# Patient Record
Sex: MALE | Race: WHITE | Employment: FULL TIME | URBAN - NONMETROPOLITAN AREA
[De-identification: names, ages, dates, MRNs, and addresses within clinical notes are randomized per-mention and may not be internally consistent; named-entity substitution may affect disease eponyms.]

---

## 2024-03-06 ENCOUNTER — OFFICE VISIT (OUTPATIENT)
Dept: ORTHOPEDIC SURGERY | Age: 44
End: 2024-03-06
Payer: COMMERCIAL

## 2024-03-06 VITALS — BODY MASS INDEX: 30.06 KG/M2 | HEIGHT: 70 IN | WEIGHT: 210 LBS

## 2024-03-06 DIAGNOSIS — M25.562 ACUTE PAIN OF LEFT KNEE: Primary | ICD-10-CM

## 2024-03-06 DIAGNOSIS — M17.10 KNEE ARTHROPATHY: ICD-10-CM

## 2024-03-06 PROCEDURE — 99203 OFFICE O/P NEW LOW 30 MIN: CPT | Performed by: PHYSICIAN ASSISTANT

## 2024-03-06 NOTE — PROGRESS NOTES
[facility]  New Patient Note      CHIEF COMPLAINT:   Chief Complaint   Patient presents with    Knee Pain     Pt presents this AM with c/o pain in his L knee. States that he has noticed pain since yesterday. Noticed pop while turing and pivoting. Pain is in medial and lateral knee, and is most notable with extension and flexion. ROM it limited by pain. Has taken Ibuprofen to manage his symptoms.        HISTORY OF PRESENT ILLNESS:                The patient is a 43 y.o. male who presents today with complaints of left knee pain that began yesterday when he pivoted on his knee and heard a pop.  He reports he had immediate pain and swelling following that incident.  Since that time he has had difficulty bearing weight.  Pt localizes the pain to medial and lateral knee.  Pt denies any numbness, tingling, loss of sensation or radiation of symptoms into toes.  Pain is worse with range of motion, ambulation, weightbearing.  They have tried at home therapies of ibuprofen for symptomatic relief as well as bracing.  Pt has history of ACL tear with surgical fixation approxi-10 years ago Dr. Lewis.    Past Medical History:    No past medical history on file.  Past Surgical History:    No past surgical history on file.  Current Medications:   No current facility-administered medications for this visit.  Allergies:  Patient has no allergy information on record.    Social History:   TOBACCO:   has no history on file for tobacco use.  ETOH:   has no history on file for alcohol use.  DRUGS:   has no history on file for drug use.    Review of Systems   Constitutional: Negative for fever, chills, diaphoresis, appetite change and fatigue.   HENT: Negative for dental issues, hearing loss and tinnitus. Negative for congestion, sinus pressure, sneezing, sore throat. Negative for headache.  Eyes: Negative for visual disturbance, blurred and double vision. Negative for pain, discharge, redness and

## 2024-03-06 NOTE — PATIENT INSTRUCTIONS
*At this time I have recommended starting an anti-inflammatory, OTC ibuprofen as needed for pain, with GI precautions.  If patient would develop any GI upset, nausea, vomiting, change in appetite, blood in stools he should discontinue the medication and contact our office immediately.  They are also aware that he should not take any other over-the-counter anti-inflammatories while on this.  They can use Tylenol 500mg 2 tablets PO q8 hours PRN pain.

## 2024-03-08 ENCOUNTER — HOSPITAL ENCOUNTER (OUTPATIENT)
Dept: MRI IMAGING | Age: 44
Discharge: HOME | End: 2024-03-08
Payer: COMMERCIAL

## 2024-03-08 ENCOUNTER — TELEPHONE (OUTPATIENT)
Dept: ORTHOPEDIC SURGERY | Age: 44
End: 2024-03-08

## 2024-03-08 DIAGNOSIS — M17.10 KNEE ARTHROPATHY: ICD-10-CM

## 2024-03-08 DIAGNOSIS — M25.562 ACUTE PAIN OF LEFT KNEE: ICD-10-CM

## 2024-03-08 DIAGNOSIS — S83.512A RUPTURE OF ANTERIOR CRUCIATE LIGAMENT OF LEFT KNEE, INITIAL ENCOUNTER: Primary | ICD-10-CM

## 2024-03-08 PROCEDURE — 73721 MRI JNT OF LWR EXTRE W/O DYE: CPT

## 2024-03-08 NOTE — TELEPHONE ENCOUNTER
MRI suggestive of at least partial tear of ACL graft.  Moderate effusion, partially ruptured bakers cyst.  At this time, I recommend keeping him in knee brace locked in extension and referral to Dr. Renee.

## 2024-04-01 ENCOUNTER — OFFICE VISIT (OUTPATIENT)
Dept: ORTHOPEDIC SURGERY | Age: 44
End: 2024-04-01
Payer: COMMERCIAL

## 2024-04-01 VITALS — HEIGHT: 70 IN | BODY MASS INDEX: 30.06 KG/M2 | WEIGHT: 210 LBS

## 2024-04-01 DIAGNOSIS — S83.512A RUPTURE OF ANTERIOR CRUCIATE LIGAMENT OF LEFT KNEE, INITIAL ENCOUNTER: Primary | ICD-10-CM

## 2024-04-01 PROCEDURE — 99203 OFFICE O/P NEW LOW 30 MIN: CPT | Performed by: ORTHOPAEDIC SURGERY

## 2024-04-01 NOTE — PROGRESS NOTES
hip exam shows normal range of motion without pain with impingement testing.      Left knee exam displays range of motion 0-130.  There is positive pain with deep knee flexion.  Stable valgus and varus stress testing.  Patella stable tracking midline.  Positive Lachman test.  Intact posterior drawer test.       IMAGING:    Previous x-rays reviewed showing read tearing of his ACL graft.  There is evidence of cystic changes and widening of his previous ACL tunnels.        ASSESSMENT  Left knee ACL graft re tear    PLAN  Today we discussed his left knee.  Patient suffered an acute injury and read tearing of his ACL graft 1 month ago when he fell down 4 steps.  MRI findings were reviewed with him today.  Given his age and desire to remain active and return to a labor-intensive job he is interested in surgical management.  Surgery would involve a left knee arthroscopy, ACL reconstruction with allograft.  MRI showing evidence of cystic changes and graft tunnel widening.  Would like to obtain a CT scan to further assess his prior tunnels for potential staged procedure and need for ACL tunnel bone grafting.        MORGAN Weldon  Orthopedic Surgery   04/02/24  12:07 PM    Staff Addendum    I have seen and evaluated the patient and agree with the assessment and plan as documented by Sanjay Escobar CNP. I have performed the key components of the history and physical examination and concur with the findings and plan, and have made changes where appropriate/necessary.      Agree with above.  He has tear of his ACL graft.  He has tunnel widening seen on his x-ray and the tibia.  He has interference screws in the tibia and femur.  He has an unstable knee on exam and has not been able to return to work and is interested in revision.  I would like to obtain a CT scan to better assess his tunnels for potential staged reconstruction.  He is in agreement.  CT scan ordered.  I will call with results and determine treatment

## 2024-04-10 ENCOUNTER — HOSPITAL ENCOUNTER (OUTPATIENT)
Dept: CT IMAGING | Age: 44
Discharge: HOME | End: 2024-04-12
Attending: ORTHOPAEDIC SURGERY
Payer: COMMERCIAL

## 2024-04-10 DIAGNOSIS — S83.512A RUPTURE OF ANTERIOR CRUCIATE LIGAMENT OF LEFT KNEE, INITIAL ENCOUNTER: ICD-10-CM

## 2024-04-10 PROCEDURE — 73700 CT LOWER EXTREMITY W/O DYE: CPT

## 2024-04-18 ENCOUNTER — TELEPHONE (OUTPATIENT)
Dept: ORTHOPEDIC SURGERY | Age: 44
End: 2024-04-18

## 2024-04-18 PROBLEM — T84.490A: Status: ACTIVE | Noted: 2024-04-18

## 2024-04-18 NOTE — TELEPHONE ENCOUNTER
----- Message from Reginaldo Renee MD sent at 4/18/2024  3:07 PM EDT -----  I discussed CT scan results with Mario showing no total widening of the femur.  There is tunnel widening of the tibia up to 11 to 12 mm in some planes.  Screws are present in the tibia and femur.  We will plan for revision ACL reconstruction with allograft, we discussed the potential need for staged procedure with bone grafting the tibia.  He understands.  Will look at scheduling this in the near future.  He also has inquired about his short-term disability paperwork.  Please reach out to him regarding this and regarding scheduling.  His surgery will need to be done at [facility].

## 2024-04-18 NOTE — TELEPHONE ENCOUNTER
[facility]  ORTHOPAEDIC SURGERY SCHEDULING NOTE    Patient wishes to proceed after surgery discussion with Dr. Renee.  Notified that pre-admission testing will also be reaching out for education and instructions on arrival time prior to procedure.     Patient is to obtain clearance(s) from: [facility]ONICA     Authorization submitted to [employer]    Status: NOT REQ.     Called [facility] rep stated that CPT Code 96233, auth is not required for CPT 25114 based on member insurance plan as per call ref#[id number]     Surgery: LEFT KNEE ARTHROSCOPY, REVISION ACL RECONSTRUCTION WITH ALLOGRAFT   OR DATE: 4/30 [facility]   Vendor: [facility]  Block:  VERONICA  CPT: 03338   DX: T84.490S    Special Needs (if applicable):        Scheduled: [facility] OR Staff

## 2024-05-14 RX ORDER — CALCIUM CARBONATE 500 MG/1
1 TABLET, CHEWABLE ORAL NIGHTLY
COMMUNITY

## 2024-05-15 ENCOUNTER — ANESTHESIA EVENT (OUTPATIENT)
Dept: OPERATING ROOM | Age: 44
End: 2024-05-15
Payer: COMMERCIAL

## 2024-05-16 ENCOUNTER — HOSPITAL ENCOUNTER (OUTPATIENT)
Age: 44
Setting detail: OUTPATIENT SURGERY
Discharge: HOME | End: 2024-05-16
Attending: ORTHOPAEDIC SURGERY | Admitting: ORTHOPAEDIC SURGERY
Payer: COMMERCIAL

## 2024-05-16 ENCOUNTER — HOSPITAL ENCOUNTER (OUTPATIENT)
Dept: GENERAL RADIOLOGY | Age: 44
Discharge: HOME | End: 2024-05-18
Attending: ORTHOPAEDIC SURGERY
Payer: COMMERCIAL

## 2024-05-16 ENCOUNTER — ANESTHESIA (OUTPATIENT)
Dept: OPERATING ROOM | Age: 44
End: 2024-05-16
Payer: COMMERCIAL

## 2024-05-16 VITALS
DIASTOLIC BLOOD PRESSURE: 82 MMHG | WEIGHT: 210 LBS | HEART RATE: 74 BPM | RESPIRATION RATE: 20 BRPM | SYSTOLIC BLOOD PRESSURE: 125 MMHG | HEIGHT: 70 IN | BODY MASS INDEX: 30.06 KG/M2 | TEMPERATURE: 97 F | OXYGEN SATURATION: 95 %

## 2024-05-16 DIAGNOSIS — T84.490A: ICD-10-CM

## 2024-05-16 DIAGNOSIS — Z98.890 STATUS POST RECONSTRUCTION OF ANTERIOR CRUCIATE LIGAMENT: ICD-10-CM

## 2024-05-16 DIAGNOSIS — Z98.890 STATUS POST ARTHROSCOPY OF LEFT KNEE: Primary | ICD-10-CM

## 2024-05-16 DIAGNOSIS — R52 PAIN: ICD-10-CM

## 2024-05-16 PROCEDURE — 6360000002 HC RX W HCPCS: Performed by: ANESTHESIOLOGY

## 2024-05-16 PROCEDURE — 6360000002 HC RX W HCPCS: Performed by: NURSE PRACTITIONER

## 2024-05-16 PROCEDURE — 3600000014 HC SURGERY LEVEL 4 ADDTL 15MIN: Performed by: ORTHOPAEDIC SURGERY

## 2024-05-16 PROCEDURE — 6360000002 HC RX W HCPCS

## 2024-05-16 PROCEDURE — 2500000003 HC RX 250 WO HCPCS

## 2024-05-16 PROCEDURE — C1776 JOINT DEVICE (IMPLANTABLE): HCPCS | Performed by: ORTHOPAEDIC SURGERY

## 2024-05-16 PROCEDURE — 29882 ARTHRS KNE SRG MNISC RPR M/L: CPT | Performed by: ORTHOPAEDIC SURGERY

## 2024-05-16 PROCEDURE — 7100000010 HC PHASE II RECOVERY - FIRST 15 MIN: Performed by: ORTHOPAEDIC SURGERY

## 2024-05-16 PROCEDURE — 7100000011 HC PHASE II RECOVERY - ADDTL 15 MIN: Performed by: ORTHOPAEDIC SURGERY

## 2024-05-16 PROCEDURE — 7100000000 HC PACU RECOVERY - FIRST 15 MIN: Performed by: ORTHOPAEDIC SURGERY

## 2024-05-16 PROCEDURE — 3700000001 HC ADD 15 MINUTES (ANESTHESIA): Performed by: ORTHOPAEDIC SURGERY

## 2024-05-16 PROCEDURE — 2720000010 HC SURG SUPPLY STERILE: Performed by: ORTHOPAEDIC SURGERY

## 2024-05-16 PROCEDURE — 88300 SURGICAL PATH GROSS: CPT

## 2024-05-16 PROCEDURE — 6360000002 HC RX W HCPCS: Performed by: ORTHOPAEDIC SURGERY

## 2024-05-16 PROCEDURE — 2580000003 HC RX 258

## 2024-05-16 PROCEDURE — C1713 ANCHOR/SCREW BN/BN,TIS/BN: HCPCS | Performed by: ORTHOPAEDIC SURGERY

## 2024-05-16 PROCEDURE — 7100000001 HC PACU RECOVERY - ADDTL 15 MIN: Performed by: ORTHOPAEDIC SURGERY

## 2024-05-16 PROCEDURE — 3600000004 HC SURGERY LEVEL 4 BASE: Performed by: ORTHOPAEDIC SURGERY

## 2024-05-16 PROCEDURE — 29888 ARTHRS AID ACL RPR/AGMNTJ: CPT | Performed by: ORTHOPAEDIC SURGERY

## 2024-05-16 PROCEDURE — 64447 NJX AA&/STRD FEMORAL NRV IMG: CPT | Performed by: ANESTHESIOLOGY

## 2024-05-16 PROCEDURE — 2709999900 HC NON-CHARGEABLE SUPPLY: Performed by: ORTHOPAEDIC SURGERY

## 2024-05-16 PROCEDURE — 2580000003 HC RX 258: Performed by: NURSE PRACTITIONER

## 2024-05-16 PROCEDURE — 6370000000 HC RX 637 (ALT 250 FOR IP): Performed by: ANESTHESIOLOGY

## 2024-05-16 PROCEDURE — 3700000000 HC ANESTHESIA ATTENDED CARE: Performed by: ORTHOPAEDIC SURGERY

## 2024-05-16 PROCEDURE — L1810 KO ELASTIC WITH JOINTS: HCPCS | Performed by: ORTHOPAEDIC SURGERY

## 2024-05-16 PROCEDURE — 2500000003 HC RX 250 WO HCPCS: Performed by: ORTHOPAEDIC SURGERY

## 2024-05-16 DEVICE — IMPLANTABLE DEVICE: Type: IMPLANTABLE DEVICE | Site: KNEE | Status: FUNCTIONAL

## 2024-05-16 DEVICE — TIGHTROPE® II BTB, RECON IB™
Type: IMPLANTABLE DEVICE | Site: KNEE | Status: FUNCTIONAL
Brand: ARTHREX®

## 2024-05-16 DEVICE — IMPLANTABLE DEVICE
Type: IMPLANTABLE DEVICE | Site: KNEE | Status: FUNCTIONAL
Brand: FIBERSTITCH™ IMPLANT, 24° CURVE WITH TWO POLYESTER IMPLANTS AND 2-0 FIBERWIRE® S

## 2024-05-16 DEVICE — PEEK, MENISCAL ROOT REPAIR KIT
Type: IMPLANTABLE DEVICE | Site: KNEE | Status: FUNCTIONAL
Brand: ARTHREX®

## 2024-05-16 DEVICE — ALLOSYNC DBM GEL, 5CC SYRINGE
Type: IMPLANTABLE DEVICE | Site: KNEE | Status: FUNCTIONAL
Brand: ARTHREX

## 2024-05-16 DEVICE — Ø9X 20MM BC IF SCRW, VENTED
Type: IMPLANTABLE DEVICE | Site: KNEE | Status: FUNCTIONAL
Brand: ARTHREX®

## 2024-05-16 RX ORDER — SODIUM CHLORIDE 9 MG/ML
INJECTION, SOLUTION INTRAVENOUS CONTINUOUS PRN
Status: DISCONTINUED | OUTPATIENT
Start: 2024-05-16 | End: 2024-05-16 | Stop reason: SDUPTHER

## 2024-05-16 RX ORDER — FENTANYL CITRATE 50 UG/ML
25 INJECTION, SOLUTION INTRAMUSCULAR; INTRAVENOUS EVERY 5 MIN PRN
Status: DISCONTINUED | OUTPATIENT
Start: 2024-05-16 | End: 2024-05-16 | Stop reason: HOSPADM

## 2024-05-16 RX ORDER — SODIUM CHLORIDE 9 MG/ML
INJECTION, SOLUTION INTRAVENOUS PRN
Status: DISCONTINUED | OUTPATIENT
Start: 2024-05-16 | End: 2024-05-16 | Stop reason: HOSPADM

## 2024-05-16 RX ORDER — PROCHLORPERAZINE EDISYLATE 5 MG/ML
5 INJECTION INTRAMUSCULAR; INTRAVENOUS
Status: DISCONTINUED | OUTPATIENT
Start: 2024-05-16 | End: 2024-05-16 | Stop reason: HOSPADM

## 2024-05-16 RX ORDER — SODIUM CHLORIDE 0.9 % (FLUSH) 0.9 %
5-40 SYRINGE (ML) INJECTION EVERY 12 HOURS SCHEDULED
Status: DISCONTINUED | OUTPATIENT
Start: 2024-05-16 | End: 2024-05-16 | Stop reason: HOSPADM

## 2024-05-16 RX ORDER — DEXTROSE MONOHYDRATE 100 MG/ML
INJECTION, SOLUTION INTRAVENOUS CONTINUOUS PRN
Status: DISCONTINUED | OUTPATIENT
Start: 2024-05-16 | End: 2024-05-16 | Stop reason: HOSPADM

## 2024-05-16 RX ORDER — MIDAZOLAM HYDROCHLORIDE 1 MG/ML
INJECTION INTRAMUSCULAR; INTRAVENOUS
Status: COMPLETED | OUTPATIENT
Start: 2024-05-16 | End: 2024-05-16

## 2024-05-16 RX ORDER — SODIUM CHLORIDE 0.9 % (FLUSH) 0.9 %
5-40 SYRINGE (ML) INJECTION PRN
Status: DISCONTINUED | OUTPATIENT
Start: 2024-05-16 | End: 2024-05-16 | Stop reason: HOSPADM

## 2024-05-16 RX ORDER — HYDROCODONE BITARTRATE AND ACETAMINOPHEN 5; 325 MG/1; MG/1
1 TABLET ORAL
Status: COMPLETED | OUTPATIENT
Start: 2024-05-16 | End: 2024-05-16

## 2024-05-16 RX ORDER — FENTANYL CITRATE 50 UG/ML
INJECTION, SOLUTION INTRAMUSCULAR; INTRAVENOUS
Status: COMPLETED | OUTPATIENT
Start: 2024-05-16 | End: 2024-05-16

## 2024-05-16 RX ORDER — ROCURONIUM BROMIDE 10 MG/ML
INJECTION, SOLUTION INTRAVENOUS PRN
Status: DISCONTINUED | OUTPATIENT
Start: 2024-05-16 | End: 2024-05-16 | Stop reason: SDUPTHER

## 2024-05-16 RX ORDER — HYDROCODONE BITARTRATE AND ACETAMINOPHEN 5; 325 MG/1; MG/1
1 TABLET ORAL EVERY 6 HOURS PRN
Qty: 28 TABLET | Refills: 0 | Status: SHIPPED | OUTPATIENT
Start: 2024-05-16 | End: 2024-05-23

## 2024-05-16 RX ORDER — DIPHENHYDRAMINE HYDROCHLORIDE 50 MG/ML
12.5 INJECTION INTRAMUSCULAR; INTRAVENOUS
Status: DISCONTINUED | OUTPATIENT
Start: 2024-05-16 | End: 2024-05-16 | Stop reason: HOSPADM

## 2024-05-16 RX ORDER — IPRATROPIUM BROMIDE AND ALBUTEROL SULFATE 2.5; .5 MG/3ML; MG/3ML
1 SOLUTION RESPIRATORY (INHALATION)
Status: DISCONTINUED | OUTPATIENT
Start: 2024-05-16 | End: 2024-05-16 | Stop reason: HOSPADM

## 2024-05-16 RX ORDER — KETOROLAC TROMETHAMINE 10 MG/1
10 TABLET, FILM COATED ORAL EVERY 6 HOURS
Qty: 8 TABLET | Refills: 0 | Status: SHIPPED | OUTPATIENT
Start: 2024-05-16 | End: 2024-05-18

## 2024-05-16 RX ORDER — MEPERIDINE HYDROCHLORIDE 25 MG/ML
12.5 INJECTION INTRAMUSCULAR; INTRAVENOUS; SUBCUTANEOUS EVERY 5 MIN PRN
Status: DISCONTINUED | OUTPATIENT
Start: 2024-05-16 | End: 2024-05-16 | Stop reason: HOSPADM

## 2024-05-16 RX ORDER — FENTANYL CITRATE 50 UG/ML
100 INJECTION, SOLUTION INTRAMUSCULAR; INTRAVENOUS ONCE
Status: COMPLETED | OUTPATIENT
Start: 2024-05-16 | End: 2024-05-16

## 2024-05-16 RX ORDER — MIDAZOLAM HYDROCHLORIDE 2 MG/2ML
1 INJECTION, SOLUTION INTRAMUSCULAR; INTRAVENOUS EVERY 5 MIN PRN
Status: DISCONTINUED | OUTPATIENT
Start: 2024-05-16 | End: 2024-05-16 | Stop reason: HOSPADM

## 2024-05-16 RX ORDER — PROPOFOL 10 MG/ML
INJECTION, EMULSION INTRAVENOUS PRN
Status: DISCONTINUED | OUTPATIENT
Start: 2024-05-16 | End: 2024-05-16 | Stop reason: SDUPTHER

## 2024-05-16 RX ORDER — GLYCOPYRROLATE 0.2 MG/ML
INJECTION INTRAMUSCULAR; INTRAVENOUS PRN
Status: DISCONTINUED | OUTPATIENT
Start: 2024-05-16 | End: 2024-05-16 | Stop reason: SDUPTHER

## 2024-05-16 RX ORDER — ROPIVACAINE HYDROCHLORIDE 5 MG/ML
INJECTION, SOLUTION EPIDURAL; INFILTRATION; PERINEURAL
Status: COMPLETED | OUTPATIENT
Start: 2024-05-16 | End: 2024-05-16

## 2024-05-16 RX ORDER — ONDANSETRON 2 MG/ML
4 INJECTION INTRAMUSCULAR; INTRAVENOUS
Status: DISCONTINUED | OUTPATIENT
Start: 2024-05-16 | End: 2024-05-16 | Stop reason: HOSPADM

## 2024-05-16 RX ORDER — DEXAMETHASONE SODIUM PHOSPHATE 4 MG/ML
INJECTION, SOLUTION INTRA-ARTICULAR; INTRALESIONAL; INTRAMUSCULAR; INTRAVENOUS; SOFT TISSUE PRN
Status: DISCONTINUED | OUTPATIENT
Start: 2024-05-16 | End: 2024-05-16 | Stop reason: SDUPTHER

## 2024-05-16 RX ORDER — NALOXONE HYDROCHLORIDE 0.4 MG/ML
INJECTION, SOLUTION INTRAMUSCULAR; INTRAVENOUS; SUBCUTANEOUS PRN
Status: DISCONTINUED | OUTPATIENT
Start: 2024-05-16 | End: 2024-05-16 | Stop reason: HOSPADM

## 2024-05-16 RX ORDER — GLUCAGON 1 MG/ML
1 KIT INJECTION PRN
Status: DISCONTINUED | OUTPATIENT
Start: 2024-05-16 | End: 2024-05-16 | Stop reason: HOSPADM

## 2024-05-16 RX ORDER — LIDOCAINE HYDROCHLORIDE 20 MG/ML
INJECTION, SOLUTION EPIDURAL; INFILTRATION; INTRACAUDAL; PERINEURAL PRN
Status: DISCONTINUED | OUTPATIENT
Start: 2024-05-16 | End: 2024-05-16 | Stop reason: SDUPTHER

## 2024-05-16 RX ORDER — ROPIVACAINE HYDROCHLORIDE 5 MG/ML
30 INJECTION, SOLUTION EPIDURAL; INFILTRATION; PERINEURAL
Status: DISCONTINUED | OUTPATIENT
Start: 2024-05-16 | End: 2024-05-16 | Stop reason: HOSPADM

## 2024-05-16 RX ORDER — ONDANSETRON 2 MG/ML
INJECTION INTRAMUSCULAR; INTRAVENOUS PRN
Status: DISCONTINUED | OUTPATIENT
Start: 2024-05-16 | End: 2024-05-16 | Stop reason: SDUPTHER

## 2024-05-16 RX ADMIN — ROCURONIUM BROMIDE 50 MG: 10 INJECTION, SOLUTION INTRAVENOUS at 10:03

## 2024-05-16 RX ADMIN — HYDROMORPHONE HYDROCHLORIDE 0.5 MG: 1 INJECTION, SOLUTION INTRAMUSCULAR; INTRAVENOUS; SUBCUTANEOUS at 13:46

## 2024-05-16 RX ADMIN — ONDANSETRON 4 MG: 2 INJECTION INTRAMUSCULAR; INTRAVENOUS at 12:47

## 2024-05-16 RX ADMIN — GLYCOPYRROLATE 0.2 MG: 0.2 INJECTION INTRAMUSCULAR; INTRAVENOUS at 11:39

## 2024-05-16 RX ADMIN — FENTANYL CITRATE 50 MCG: 50 INJECTION, SOLUTION INTRAMUSCULAR; INTRAVENOUS at 08:34

## 2024-05-16 RX ADMIN — ROCURONIUM BROMIDE 10 MG: 10 INJECTION, SOLUTION INTRAVENOUS at 11:41

## 2024-05-16 RX ADMIN — FENTANYL CITRATE 50 MCG: 50 INJECTION INTRAMUSCULAR; INTRAVENOUS at 08:33

## 2024-05-16 RX ADMIN — ROPIVACAINE HYDROCHLORIDE 20 ML: 5 INJECTION, SOLUTION EPIDURAL; INFILTRATION; PERINEURAL at 08:34

## 2024-05-16 RX ADMIN — SUGAMMADEX 250 MG: 100 INJECTION, SOLUTION INTRAVENOUS at 13:00

## 2024-05-16 RX ADMIN — ROCURONIUM BROMIDE 20 MG: 10 INJECTION, SOLUTION INTRAVENOUS at 10:47

## 2024-05-16 RX ADMIN — FENTANYL CITRATE 25 MCG: 50 INJECTION, SOLUTION INTRAMUSCULAR; INTRAVENOUS at 10:51

## 2024-05-16 RX ADMIN — WATER 2000 MG: 1 INJECTION INTRAMUSCULAR; INTRAVENOUS; SUBCUTANEOUS at 10:03

## 2024-05-16 RX ADMIN — FENTANYL CITRATE 25 MCG: 50 INJECTION, SOLUTION INTRAMUSCULAR; INTRAVENOUS at 12:55

## 2024-05-16 RX ADMIN — FENTANYL CITRATE 50 MCG: 50 INJECTION, SOLUTION INTRAMUSCULAR; INTRAVENOUS at 13:06

## 2024-05-16 RX ADMIN — SODIUM CHLORIDE: 9 INJECTION, SOLUTION INTRAVENOUS at 09:48

## 2024-05-16 RX ADMIN — DEXAMETHASONE SODIUM PHOSPHATE 10 MG: 4 INJECTION, SOLUTION INTRAMUSCULAR; INTRAVENOUS at 10:03

## 2024-05-16 RX ADMIN — PROPOFOL 200 MG: 10 INJECTION, EMULSION INTRAVENOUS at 10:03

## 2024-05-16 RX ADMIN — MIDAZOLAM HYDROCHLORIDE 2 MG: 1 INJECTION, SOLUTION INTRAMUSCULAR; INTRAVENOUS at 08:33

## 2024-05-16 RX ADMIN — MIDAZOLAM 2 MG: 1 INJECTION INTRAMUSCULAR; INTRAVENOUS at 08:34

## 2024-05-16 RX ADMIN — FENTANYL CITRATE 50 MCG: 50 INJECTION, SOLUTION INTRAMUSCULAR; INTRAVENOUS at 10:03

## 2024-05-16 RX ADMIN — LIDOCAINE HYDROCHLORIDE 100 MG: 20 INJECTION, SOLUTION EPIDURAL; INFILTRATION; INTRACAUDAL; PERINEURAL at 10:03

## 2024-05-16 RX ADMIN — HYDROCODONE BITARTRATE AND ACETAMINOPHEN 1 TABLET: 5; 325 TABLET ORAL at 15:09

## 2024-05-16 RX ADMIN — FENTANYL CITRATE 50 MCG: 50 INJECTION, SOLUTION INTRAMUSCULAR; INTRAVENOUS at 13:08

## 2024-05-16 ASSESSMENT — PAIN DESCRIPTION - DESCRIPTORS
DESCRIPTORS: DISCOMFORT
DESCRIPTORS: ACHING;SORE
DESCRIPTORS: DISCOMFORT
DESCRIPTORS: ACHING;DISCOMFORT;SORE

## 2024-05-16 ASSESSMENT — PAIN SCALES - GENERAL
PAINLEVEL_OUTOF10: 6
PAINLEVEL_OUTOF10: 0
PAINLEVEL_OUTOF10: 3
PAINLEVEL_OUTOF10: 7

## 2024-05-16 ASSESSMENT — PAIN DESCRIPTION - LOCATION
LOCATION: KNEE

## 2024-05-16 ASSESSMENT — LIFESTYLE VARIABLES: SMOKING_STATUS: 1

## 2024-05-16 ASSESSMENT — PAIN - FUNCTIONAL ASSESSMENT
PAIN_FUNCTIONAL_ASSESSMENT: 0-10
PAIN_FUNCTIONAL_ASSESSMENT: 0-10
PAIN_FUNCTIONAL_ASSESSMENT: PREVENTS OR INTERFERES SOME ACTIVE ACTIVITIES AND ADLS
PAIN_FUNCTIONAL_ASSESSMENT: 0-10

## 2024-05-16 ASSESSMENT — PAIN DESCRIPTION - ORIENTATION
ORIENTATION: LEFT
ORIENTATION: LEFT

## 2024-05-16 NOTE — OP NOTE
OPERATIVE REPORT    PATIENT:  Ronny Espinoza  [id number]    DATE OF PROCEDURE:  5/16/24    SURGEON: Reginaldo Renee MD    ASSISTANT:  Jun Calderon DO, Sanjay Escobar, JUSTIN.      PREOPERATIVE DIAGNOSIS: Failed left knee ACL reconstruction      POSTOPERATIVE DIAGNOSIS: Same, medial meniscus tear    OPERATION:  Left knee arthroscopy, removal of hardware, revision ACL reconstruction with internal brace, medial meniscus repair      ANESTHESIA: . general    OPERATIVE INDICATIONS:  The patient is a 43 year old male with history of left knee ACL reconstruction about 14 to 15 years ago.  He is somewhat of a poor historian.  He did well until just recently when he suffered a new injury when he slipped on stairs.  He was worked up including MRI showing ACL graft tear.  He complained of pain and instability in his knee.  He is still very active and hopes to remain so.  He is therefore interested in ACL revision surgery.  We discussed this would involve removal of his previous screws, possible staged procedure to graft his tunnels depending on tunnel position.  We also discussed single-stage hardware removal and revision ACL reconstruction.. The risks, benefits, and alternatives to treatment were discussed at length with the patient.  These risks include, but are not limited to infection, nerve and/or blood vessel injury, need for graft utilization other than that pre-planned,  Need to perform further arthroscopic procedures based on findings at time of surgery, need for revision surgery, failure of graft or retear of graft, slightly higher rate of failure with allograft than autograft, possibility of disease transmission from cadaver tissue, possible failure of meniscal repair if repaired, deep vein thrombosis and pulmonary embolism, athrofibrosis, persistent post op pain and/or instability and the risks of general anesthesia, provided by the anesthesiologist.   The patient understood these risks, signed an informed consent,  the knee.  We tied our sutures over our femoral button and then cut the sutures.  We cut our remaining sutures from the tibial side.  Wounds were closed in layered fashion.  He was then placed into a ROM brace after placement of sterile dressing.  He was taken to the recovery room in stable condition.  He tolerated procedure well    IMPLANTS:   Implant Name Type Inv. Item Serial No.  Lot No. LRB No. Used Action   SUTURE FIBERWIRE 2-0 CURVE 24 DEGREE - [id number]  SUTURE FIBERWIRE 2-0 CURVE 24 DEGREE  [employer] [id number] Left 1 Implanted   TIGHTROPE II BTB RECON IB - [id number]  TIGHTROPE II BTB RECON IB  [employer] [id number] Left 1 Implanted   SUTURE FIBERWIRE 2-0 CURVE 24 DEGREE - [id number]  SUTURE FIBERWIRE 2-0 CURVE 24 DEGREE  [employer] [id number] Left 1 Implanted   ALLOGRAFT HUM TISS TEND 8-12.5X77-95 MM JRF GRAFTLINK XL - [id number]  ALLOGRAFT HUM TISS TEND 8-12.5X77-95 MM JRF GRAFTLINK XL [id number] [facility] [id number] Left 1 Implanted   SYSTEM IMPL MENIS ROOT REP W/ PEEK SWIVELOCK - [id number]  SYSTEM IMPL MENIS ROOT REP W/ PEEK SWIVELOCK  [employer] [id number] Left 1 Implanted   SCREW INTFR L20MM DIA9MM BIOCOMPOSITE FASTTHREAD - [id number]  SCREW INTFR L20MM DIA9MM BIOCOMPOSITE FASTTHREAD  [employer]WD [id number] Left 1 Implanted   GRAFT BNE SUB 5CC DEMIN BNE MTRX GEL DBL SYR SYS ALLOSYNC - [id number]  GRAFT BNE SUB 5CC DEMIN BNE MTRX GEL DBL SYR SYS ALLOSYNC [id number] [employer] [id number] Left 1 Implanted       TOURNIQUET TIME: not applicable    ESTIMATED BLOOD LOSS: 20  mL    COMPLICATIONS: none    POST OPERATIVE PLAN: The patient will be discharged home from same day surgery once stable.   He will we weight bearing as tolerated in his brace locked in extension.  He will be seen for a post op visit tomorrow      Reginaldo Renee MD  Orthopaedic Surgery   5/16/24  1:26 PM

## 2024-05-16 NOTE — H&P
Department of Orthopedic Surgery  Attending Pre-operative History and Physical        DIAGNOSIS: Left knee ACL graft re tear    INDICATION: Failed conservative treatment    PROCEDURE: Left knee arthroscopy, revision ACL reconstruction with allograft, hardware removal, possible staged procedure with tibial tunnel bone grafting    CHIEF COMPLAINT: Left knee pain and instability    History Obtained From:  patient    HISTORY OF PRESENT ILLNESS:      The patient is a 43 y.o. male with significant past medical history of left knee ACL graft re tear who presents with left knee pain and instability.  Patient has failed conservative treatment.  MRI demonstrates re tearing of his ACL graft, with evidence of cystic changes and widening of his previous ACL tunnels.  He continues to have feelings of pain and instability affecting his daily activities and ability to perform his job duties.  For these reasons he is interested in proceeding with surgical management.  Surgery will be in the form of a left knee arthroscopy, revision ACL reconstruction with allograft, hardware removal, possible staged procedure with tibial tunnel bone grafting. The risks, benefits, and alternatives to the procedure were discussed at length with the patient and family members. Risks include but are not limited to infection, neurovascular injury, intraoperative fracture, postoperative stiffness requiring possible manipulation, graft failure, graft re-tear, and eventual development of arthritis.  We also discussed the very low risk of disease transmission with use of allograft, and a slightly higher rate of re-tear compared to autograft tissue. Patient verbalizes understanding of the risks and wishes to proceed.       Past Medical History:        Diagnosis Date    Left ACL tear        Past Surgical History:        Procedure Laterality Date    ANTERIOR CRUCIATE LIGAMENT REPAIR Left     in his 30s - Dr Lewis       Medications Prior to Admission:

## 2024-05-16 NOTE — ANESTHESIA PROCEDURE NOTES
Peripheral Block    Patient location during procedure: PACU  Reason for block: post-op pain management  Start time: 5/16/2024 8:34 AM  End time: 5/16/2024 8:36 AM  Staffing  Performed: anesthesiologist   Anesthesiologist: Denny Rebolledo DO  Performed by: Denny Rebolledo DO  Authorized by: Denny Rebolledo DO    Preanesthetic Checklist  Completed: patient identified, IV checked, site marked, risks and benefits discussed, surgical/procedural consents, equipment checked, pre-op evaluation, timeout performed, anesthesia consent given, oxygen available, monitors applied/VS acknowledged, fire risk safety assessment completed and verbalized and blood product R/B/A discussed and consented  Peripheral Block   Patient position: supine  Prep: ChloraPrep  Provider prep: mask and sterile gloves  Patient monitoring: cardiac monitor, continuous pulse ox, frequent blood pressure checks, IV access, oxygen and responsive to questions  Block type: Saphenous  Laterality: left  Injection technique: single-shot  Guidance: ultrasound guided    Needle   Needle type: insulated echogenic nerve stimulator needle   Needle gauge: 22 G  Needle localization: ultrasound guidance  Needle length: 8 cm  Assessment   Injection assessment: negative aspiration for heme, no paresthesia on injection, local visualized surrounding nerve on ultrasound and no intravascular symptoms  Paresthesia pain: none  Slow fractionated injection: yes  Hemodynamics: stable  Outcomes: uncomplicated    Medications Administered  fentaNYL (SUBLIMAZE) injection - IntraVENous   50 mcg - 5/16/2024 8:34:00 AM  midazolam (VERSED) injection 2 mg/2mL - IntraVENous   2 mg - 5/16/2024 8:34:00 AM  ropivacaine (NAROPIN) injection 0.5% - Perineural   20 mL - 5/16/2024 8:34:00 AM

## 2024-05-16 NOTE — ANESTHESIA POSTPROCEDURE EVALUATION
Department of Anesthesiology  Postprocedure Note    Patient: Ronny Espinoza  MRN: [id number]  YOB: 1980  Date of evaluation: 5/16/2024    Procedure Summary       Date: 05/16/24 Room / Location: [address]    Anesthesia Start: 0948 Anesthesia Stop: 1314    Procedure: LEFT KNEE HARDWARE REMOVAL LEFT KNEE ARTHROSCOPY REVISION ACL RECONSTRUCTION WITH ALLOGRAFT WITH TIBIAL TUNNEL BONE GRAFTING (Left: Knee) Diagnosis:       Mechanical complication of graft of muscle (HCC)      (Mechanical complication of graft of muscle (HCC) [T84.490A])    Surgeons: Reginaldo Renee MD Responsible Provider: Denny Rebolledo DO    Anesthesia Type: general ASA Status: 2            Anesthesia Type: No value filed.    Garrett Phase I: Garrett Score: 9    Garrett Phase II:      Anesthesia Post Evaluation    Patient location during evaluation: PACU  Patient participation: complete - patient participated  Level of consciousness: awake  Airway patency: patent  Nausea & Vomiting: no nausea and no vomiting  Cardiovascular status: hemodynamically stable  Respiratory status: acceptable  Hydration status: stable  Pain management: adequate    No notable events documented.

## 2024-05-16 NOTE — ANESTHESIA PRE PROCEDURE
Department of Anesthesiology  Preprocedure Note       Name:  Ronny Espinoza   Age:  43 y.o.  :  1980                                          MRN:  [id number]         Date:  2024      Surgeon: Surgeon(s):  Reginaldo Renee MD    Procedure: Procedure(s):  LEFT KNEE HARDWARE REMOVAL ([facility]) LEFT KNEE ARTHROSCOPY REVISION ACL RECONSTRUCTION WITH ALLOGRAFT   +++[facility]+++    Medications prior to admission:   Prior to Admission medications    Medication Sig Start Date End Date Taking? Authorizing Provider   calcium carbonate (TUMS) 500 MG chewable tablet Take 1 tablet by mouth at bedtime   Yes Provider, MD Austin       Current medications:    Current Facility-Administered Medications   Medication Dose Route Frequency Provider Last Rate Last Admin   • ceFAZolin (ANCEF) 2,000 mg in sterile water 20 mL IV syringe  2,000 mg IntraVENous On Call to OR Sanjay Escobar APRN - CNP       • sodium chloride flush 0.9 % injection 5-40 mL  5-40 mL IntraVENous 2 times per day Sanjay Escobar APRN - CNP       • sodium chloride flush 0.9 % injection 5-40 mL  5-40 mL IntraVENous PRN Sanjay Escobar APRN - CNP       • 0.9 % sodium chloride infusion   IntraVENous PRN Sanjay Escobar APRN - CNP           Allergies:  No Known Allergies    Problem List:    Patient Active Problem List   Diagnosis Code   • Mechanical complication of graft of muscle (HCC) T84.490A       Past Medical History:        Diagnosis Date   • Left ACL tear        Past Surgical History:        Procedure Laterality Date   • ANTERIOR CRUCIATE LIGAMENT REPAIR Left     in his 30s - Dr Lewis       Social History:    Social History     Tobacco Use   • Smoking status: Every Day     Current packs/day: 0.50     Types: Cigarettes   • Smokeless tobacco: Former     Types: Chew   Substance Use Topics   • Alcohol use: Not Currently                                Ready to quit: Not Answered  Counseling given: Not Answered      Vital Signs (Current):

## 2024-05-16 NOTE — DISCHARGE INSTRUCTIONS
Orthopaedic Discharge Instructions    Surgery:  Knee Arthroscopy, ACL reconstruction     Activity:   You can bear weight with cane or crutches as needed .  Begin basic exercises: quad sets, leg lifts, knee bends  Apply ICE to the knee as much as possible.  If you have a brace or knee immobilizer on your knee, you can open it up to apply ice    Medications:  Take prescribed medications as indicated.  These include pain medication, anti-inflammatory    Dressing care:  Keep your dressing on until you are seen in the office    Follow up:  Your follow up appointment is scheduled for tomorrow.  Please call [phone] with any questions       Reginaldo Renee MD  Orthopaedic Surgery

## 2024-05-17 ENCOUNTER — OFFICE VISIT (OUTPATIENT)
Dept: ORTHOPEDIC SURGERY | Age: 44
End: 2024-05-17

## 2024-05-17 DIAGNOSIS — Z98.890 S/P LEFT KNEE ARTHROSCOPY: Primary | ICD-10-CM

## 2024-05-17 PROCEDURE — 99024 POSTOP FOLLOW-UP VISIT: CPT | Performed by: NURSE PRACTITIONER

## 2024-05-17 NOTE — PROGRESS NOTES
[facility]  ORTHOPAEDICS AND SPORTS MEDICINE  DATE OF VISIT: 05/17/24  Follow Up Post Operative Visit     CHIEF COMPLAINT:   Chief Complaint   Patient presents with    Follow Up After Procedure     POD#1: Left knee arthroscopy, removal of hardware, revision ACL reconstruction with internal brace, medial meniscus repair             Surgery: Left knee arthroscopy, removal of hardware, revision ACL reconstruction with internal brace, medial meniscus repair    Date: 5/16/2024      Subjective:    Ronny Espinoza is here for follow up visit s/p above procedure.  He is doing well.  He reports no overnight issues.    Controlled Substances Monitoring:        Physical Exam:    BP: 125/82 (PT PAIN STARTING TO IMPROVE/ FRONT OF KNEE), Pain 0-10: Pain Level: 5;     General: Alert and oriented x3, no acute distress  Cardiovascular/pulmonary: No labored breathing, peripheral perfusion intact  Musculoskeletal:    Exam of the left knee display stable postoperative swelling, neurovascular sensation is grossly intact, incision sites are closed edges well approximated, no drainage present surgical sutures are intact        Imaging: X-ray 2 views left knee display stable postoperative changes with good positioning of orthopedic ACL suspensory button    Assessment and Plan: Status post left knee arthroscopy, removal of hardware, revision ACL reconstruction with internal brace, medial meniscus repair    Patient is postop day 1 from procedure listed above doing well.  Postoperative imaging and intraoperative pictures were reviewed with patient today.  Patient instructed to not submerge incision site until mature scars are present.  Patient can shower for basic hygiene purposes.  Patient will refer to the handout and begin basic exercises, allowed within current restrictions.  Patient will follow-up in 1 week for suture removal.      ARMEN Weldon-CNP  Orthopedic Surgery   05/17/24  9:59 AM

## 2024-05-17 NOTE — PROGRESS NOTES
CLINICAL PHARMACY NOTE: MEDS TO BEDS    Total # of Prescriptions Filled: 2   The following medications were delivered to the patient:  Norco 5-325 mg  Ketorolac 10 mg    Additional Documentation:  
PT RECEIVED IN PACU 2 AT THIS TIME PT PAIN 7-8 /10 / APPEARS UNCOMFORTABLE , REPORT RECEIVED ASSESSMENT COMPLETED, FamilY CALLED TO E WITH PT   144O PT TAKING PO PAIN SOMEWHAT BETTER PT FELT SHOULD PROBABLY DO PAIN PILL PRIOR TO RIDE HOME SEE MAR WILL MEDICATE  1520 DISCHARGE INSTRUCTIONS GIVEN AQT THIS TIME , AS WELL AS NEW CRUTCHES FOR PT AND PT INSTRUCTED ON KEVIN HOSE AND TO WEAR FOR FIRST COUPLE WEEKS PT AND FAMILY VERBALIZED UNDERSTANDING OF INSTRUCTIONS PAMPHLETS GIVEN HAS PRESCRIPTION FOR MEDS AT HOME FROM DR AGUIRRE  
eyeglasses, contact lenses and dentures are not permitted into surgery (bring cases)      [] Please do not wear any nail polish, make up or hair products on the day of surgery    [x] You can expect a call the business day prior to procedure to notify you if your arrival time changes    [x] If you receive a survey after surgery we would greatly appreciate your comments    [] Parent/guardian of a minor must accompany their child and remain on the premises  the entire time they are under our care     [] Pediatric patients may bring favorite toy, blanket or comfort item with them    [] A caregiver or family member must remain with the patient during their stay if they are mentally handicapped, have dementia, disoriented or unable to use a call light or would be a safety concern if left unattended    [x] Please notify surgeon if you develop any illness between now and time of surgery (cold, cough, sore throat, fever, nausea, vomiting) or any signs of infections  including skin, wounds, and dental.    [x]  The Outpatient Pharmacy is available to fill your prescription here on your day of surgery, ask your preop nurse for details    [] Other instructions    EDUCATIONAL MATERIALS PROVIDED:    [] PAT Preoperative Education Packet/Booklet     [] Medication List    [] Transfusion bracelet applied with instructions    [] Shower with soap, lather and rinse well, and use CHG wipes provided the evening before surgery as instructed    [] Incentive spirometer with instructions

## 2024-05-22 LAB — SURGICAL PATHOLOGY REPORT: NORMAL

## 2024-05-24 ENCOUNTER — OFFICE VISIT (OUTPATIENT)
Dept: ORTHOPEDIC SURGERY | Age: 44
End: 2024-05-24

## 2024-05-24 VITALS — HEIGHT: 70 IN | BODY MASS INDEX: 30.06 KG/M2 | WEIGHT: 210 LBS

## 2024-05-24 DIAGNOSIS — Z98.890 S/P LEFT KNEE ARTHROSCOPY: Primary | ICD-10-CM

## 2024-05-24 PROCEDURE — 99024 POSTOP FOLLOW-UP VISIT: CPT | Performed by: ORTHOPAEDIC SURGERY

## 2024-05-24 RX ORDER — HYDROCODONE BITARTRATE AND ACETAMINOPHEN 5; 325 MG/1; MG/1
1 TABLET ORAL EVERY 6 HOURS PRN
Qty: 28 TABLET | Refills: 0 | Status: SHIPPED | OUTPATIENT
Start: 2024-05-24 | End: 2024-05-31

## 2024-05-24 NOTE — PROGRESS NOTES
Sutures removed from left knee  
  05/24/24  12:44 PM    Staff Addendum    I have seen and evaluated the patient and agree with the assessment and plan as documented by Sanjay Escobar CNP. I have performed the key components of the history and physical examination and concur with the findings and plan, and have made changes where appropriate/necessary.          Reginaldo Renee MD  [facility] Orthopa[facility]ics

## 2024-07-01 ENCOUNTER — OFFICE VISIT (OUTPATIENT)
Dept: ORTHOPEDIC SURGERY | Age: 44
End: 2024-07-01

## 2024-07-01 VITALS — BODY MASS INDEX: 30.06 KG/M2 | WEIGHT: 210 LBS | HEIGHT: 70 IN

## 2024-07-01 DIAGNOSIS — Z98.890 S/P LEFT KNEE ARTHROSCOPY: Primary | ICD-10-CM

## 2024-07-01 PROCEDURE — 99024 POSTOP FOLLOW-UP VISIT: CPT | Performed by: ORTHOPAEDIC SURGERY

## 2024-07-01 NOTE — PROGRESS NOTES
[facility]   ORTHOPAEDIC SURGERY AND SPORTS MEDICINE  DATE OF VISIT: 07/01/24  Follow Up Post Operative Visit     CHIEF COMPLAINT:   Chief Complaint   Patient presents with    Follow Up After Procedure     Surgery: Left knee arthroscopy, removal of hardware, revision ACL reconstruction with internal brace, medial meniscus repair    Date: 5/16/2024      Post-Op Check     6 weeks     Pain     Pain with straight leg raised and Short arch quads     Other     Presents WB in locked knee ROM       Surgery: Left knee arthroscopy, removal of hardware, revision ACL reconstruction with internal brace, medial meniscus repair    Date: 5/16/2024    Subjective:    Ronny Espinoza is here for follow up visit s/p above procedure.  He is doing well.  He has been progressing with PT.  Patient states that he has been ambulating with his brace locked straight.  Reports that he still working on overcoming some weakness in the operative leg.    Controlled Substances Monitoring:        Physical Exam:    Height: 1.778 m (5' 10\"), Weight - Scale: 95.3 kg (210 lb)    General: Alert and oriented x3, no acute distress  Cardiovascular/pulmonary: No labored breathing, peripheral perfusion intact  Musculoskeletal:    Left knee exam displays mild weakness with subtle foot leg during straight leg raise.  Once he is able to perform a straight leg raise there is no distinguishable weakness against resistance.  Range of motion 0-1 25.  Stable valgus varus stress testing.  Intact Lachman test.    Imaging: Reviewed    Assessment and Plan:  Status post left knee arthroscopy, removal of hardware, revision ACL reconstruction with internal brace, medial meniscus repair    Today we discussed his left knee.  He is about 6 weeks out from proceduralist above doing well.  Will progress patient through the protocol.  Patient will remain in ROM brace unlocked without restrictions on flexion.  Will begin gradually weaning himself out of his ROM brace over the next

## 2024-07-24 ENCOUNTER — TELEPHONE (OUTPATIENT)
Dept: ORTHOPEDIC SURGERY | Age: 44
End: 2024-07-24

## 2024-08-14 ENCOUNTER — OFFICE VISIT (OUTPATIENT)
Dept: ORTHOPEDIC SURGERY | Age: 44
End: 2024-08-14

## 2024-08-14 VITALS — HEIGHT: 70 IN | BODY MASS INDEX: 30.06 KG/M2 | WEIGHT: 210 LBS

## 2024-08-14 DIAGNOSIS — Z98.890 S/P LEFT KNEE ARTHROSCOPY: Primary | ICD-10-CM

## 2024-08-14 DIAGNOSIS — S83.512A RUPTURE OF ANTERIOR CRUCIATE LIGAMENT OF LEFT KNEE, INITIAL ENCOUNTER: ICD-10-CM

## 2024-08-14 PROCEDURE — 99024 POSTOP FOLLOW-UP VISIT: CPT | Performed by: ORTHOPAEDIC SURGERY

## 2024-08-14 NOTE — PROGRESS NOTES
[facility]   ORTHOPAEDIC SURGERY AND SPORTS MEDICINE  DATE OF VISIT: 08/14/24  Follow Up Post Operative Visit     CHIEF COMPLAINT:   Chief Complaint   Patient presents with    Follow Up After Procedure     Pt is here 3 months out from a left knee arthroscopy, removal of hardware, revision ACL reconstruction with internal brace, medial meniscus repair.  Pverall doing well.        Surgery: Left knee arthroscopy, removal of hardware, revision ACL reconstruction with internal brace, medial meniscus repair    Date: 5/16/2024       Subjective:    Ronny Espinoza is here for follow up visit s/p above procedure.  He is doing well.  He has been compliant with postoperative care.  He is attending physical therapy routinely.    Controlled Substances Monitoring:        Physical Exam:    Height: 1.778 m (5' 10\"), Weight - Scale: 95.3 kg (210 lb)    General: Alert and oriented x3, no acute distress  Cardiovascular/pulmonary: No labored breathing, peripheral perfusion intact  Musculoskeletal:    Left knee exam displays full range of motion.  Stable valgus varus stress testing.  Intact posterior drawer and Lachman test.  Patella tracks midline.  Extensor mechanism intact.  No weakness displayed on exam.      Imaging: Reviewed    Assessment and Plan: Status post left knee arthroscopy, removal of hardware, revision ACL reconstruction with internal brace, medial meniscus repair    Patient is 3 months out from procedure listed above doing very well.  He is attending physical therapy as directed.  He displays excellent motion and stability on exam today.  He will continue to progress through the postoperative protocol as anticipated.  He will follow-up in 3 months.      MORGAN Weldon  Orthopedic Surgery   08/14/24  3:09 PM    Staff Addendum    I have seen and evaluated the patient and agree with the assessment and plan as documented by Sanjay Escobar CNP. I have performed the key components of the history and physical

## (undated) DEVICE — Z INACTIVE USE 2624224 SOLUTION IRRIG 3000ML LAC RINGERS ARTHROMATIC PLAS CONT

## (undated) DEVICE — DRESSING,GAUZE,XEROFORM,CURAD,1"X8",ST: Brand: CURAD

## (undated) DEVICE — SPONGE LAP W18XL18IN WHT COT 4 PLY FLD STRUNG RADPQ DISP ST 2 PER PACK

## (undated) DEVICE — DRILL SURG FLIPCUTTER III

## (undated) DEVICE — ELECTRODE PT RET AD L9FT HI MOIST COND ADH HYDRGEL CORDED

## (undated) DEVICE — TOWEL,OR,DSP,ST,BLUE,STD,6/PK,12PK/CS: Brand: MEDLINE

## (undated) DEVICE — BANDAGE COMPR W6INXL12FT SMOOTH FOR LIMB EXSANG ESMARCH

## (undated) DEVICE — GOWN,SIRUS,POLYRNF,BRTHSLV,XLN/XL,20/CS: Brand: MEDLINE

## (undated) DEVICE — DRAPE,TOP,102X53,STERILE: Brand: MEDLINE

## (undated) DEVICE — SYRINGE MED 50ML LUERLOCK TIP

## (undated) DEVICE — PROBE ABLAT XL 90DEG ASPIR BPLR RF 1 PC ELECTRD ERGO HNDL

## (undated) DEVICE — SYRINGE, LUER LOCK, 5ML: Brand: MEDLINE

## (undated) DEVICE — SYRINGE MED 30ML STD CLR PLAS LUERLOCK TIP N CTRL DISP

## (undated) DEVICE — KIT BNE CEM KNEE W/ L APPL MIX AND DEL SYS

## (undated) DEVICE — SUTURE FIBERWIRE FIBERSTICK SZ 2-0 L50/12IN NONABSORBABLE AR7209

## (undated) DEVICE — APPLICATOR MEDICATED 26 CC SOLUTION HI LT ORNG CHLORAPREP

## (undated) DEVICE — NEEDLE FLTR 18GA L1.5IN MEM THK5UM BLNT DISP

## (undated) DEVICE — Device

## (undated) DEVICE — BLADE,STAINLESS-STEEL,10,STRL,DISPOSABLE: Brand: MEDLINE

## (undated) DEVICE — DRAPE,REIN 53X77,STERILE: Brand: MEDLINE

## (undated) DEVICE — 3M™ STERI-DRAPE™ U-DRAPE 1015: Brand: STERI-DRAPE™

## (undated) DEVICE — GOWN,SIRUS,FABRNF,XL,20/CS: Brand: MEDLINE

## (undated) DEVICE — BNDG,ELSTC,MATRIX,STRL,6"X5YD,LF,HOOK&LP: Brand: MEDLINE

## (undated) DEVICE — ZIMMER® STERILE DISPOSABLE TOURNIQUET CUFF WITH PLC, DUAL PORT, SINGLE BLADDER, 34 IN. (86 CM)

## (undated) DEVICE — TUBING, SUCTION, 9/32" X 10', STRAIGHT: Brand: MEDLINE

## (undated) DEVICE — GLOVE ORTHO 8   MSG9480

## (undated) DEVICE — PACK PROCEDURE SURG GEN CUST

## (undated) DEVICE — TOTAL KNEE PK

## (undated) DEVICE — PAD POS ARTHSCP DISP PIVOTPOST

## (undated) DEVICE — 4-PORT MANIFOLD: Brand: NEPTUNE 2

## (undated) DEVICE — NEEDLE HYPO 18GA L1.5IN PNK POLYPR HUB S STL REG BVL STR

## (undated) DEVICE — COVER HNDL LT DISP

## (undated) DEVICE — DOUBLE BASIN SET: Brand: MEDLINE INDUSTRIES, INC.

## (undated) DEVICE — PADDING CAST W6INXL4YD COT LO LINTING WYTEX

## (undated) DEVICE — BIT DRL DIA10MM CANN FOR ACL PCL RECON

## (undated) DEVICE — TUBING PMP L16FT MAIN DISP FOR AR-6400 AR-6475

## (undated) DEVICE — BLADE SHV L13CM DIA4MM CRV DBL CUT COOLCUT

## (undated) DEVICE — GLOVE SURG SZ 8 CRM LTX FREE POLYISOPRENE POLYMER BEAD ANTI

## (undated) DEVICE — COVER,MAYO STAND,STERILE: Brand: MEDLINE

## (undated) DEVICE — KIT INSTR TRANSTIBIAL ACL W/ SAW BLDE DISP (MUST ORDER MULTIPLES OF 5 EA)

## (undated) DEVICE — BLADE SHV L13CM DIA5MM EXCALIBUR AGG COOLCUT

## (undated) DEVICE — CRUTCH WLK AD 300LB STD AX ALUM PUSH BTTN GRDIAN

## (undated) DEVICE — MAT SURG W36XL56IN GRN FOAM ANTIFATIGUE NONSLIP DRISAFE

## (undated) DEVICE — GAUZE,SPONGE,4"X4",8PLY,STRL,LF,10/TRAY: Brand: MEDLINE

## (undated) DEVICE — NEEDLE SPNL L3.5IN PNK HUB S STL REG WALL FIT STYL W/ QNCKE

## (undated) DEVICE — KIT SURG W7XL11IN 2 PKT UNTREATED NA

## (undated) DEVICE — COVER,BOOT,FOAM,NON-SKID,HOOK-LOOP,XLG: Brand: MEDLINE INDUSTRIES, INC.